# Patient Record
Sex: MALE | Race: WHITE | Employment: FULL TIME | URBAN - NONMETROPOLITAN AREA
[De-identification: names, ages, dates, MRNs, and addresses within clinical notes are randomized per-mention and may not be internally consistent; named-entity substitution may affect disease eponyms.]

---

## 2024-01-24 ENCOUNTER — PATIENT ROUNDING (BHMG ONLY) (OUTPATIENT)
Dept: PULMONOLOGY | Facility: CLINIC | Age: 59
End: 2024-01-24
Payer: COMMERCIAL

## 2024-01-24 ENCOUNTER — OFFICE VISIT (OUTPATIENT)
Dept: PULMONOLOGY | Facility: CLINIC | Age: 59
End: 2024-01-24
Payer: COMMERCIAL

## 2024-01-24 VITALS
HEART RATE: 60 BPM | HEIGHT: 71 IN | SYSTOLIC BLOOD PRESSURE: 122 MMHG | OXYGEN SATURATION: 99 % | DIASTOLIC BLOOD PRESSURE: 68 MMHG | RESPIRATION RATE: 18 BRPM | WEIGHT: 234 LBS | BODY MASS INDEX: 32.76 KG/M2

## 2024-01-24 DIAGNOSIS — J44.9 CHRONIC OBSTRUCTIVE PULMONARY DISEASE, UNSPECIFIED COPD TYPE: ICD-10-CM

## 2024-01-24 DIAGNOSIS — R06.02 SHORTNESS OF BREATH: Primary | ICD-10-CM

## 2024-01-24 DIAGNOSIS — R06.83 SNORING: ICD-10-CM

## 2024-01-24 DIAGNOSIS — E66.9 OBESITY (BMI 30-39.9): ICD-10-CM

## 2024-01-24 DIAGNOSIS — J41.0 CHRONIC BRONCHITIS, SIMPLE: ICD-10-CM

## 2024-01-24 DIAGNOSIS — F17.210 NICOTINE DEPENDENCE, CIGARETTES, UNCOMPLICATED: ICD-10-CM

## 2024-01-24 DIAGNOSIS — G47.19 EXCESSIVE DAYTIME SLEEPINESS: ICD-10-CM

## 2024-01-24 DIAGNOSIS — G47.33 OBSTRUCTIVE SLEEP APNEA: ICD-10-CM

## 2024-01-24 PROCEDURE — 95012 NITRIC OXIDE EXP GAS DETER: CPT | Performed by: INTERNAL MEDICINE

## 2024-01-24 RX ORDER — INSULIN HUMAN 100 [IU]/ML
INJECTION, SUSPENSION SUBCUTANEOUS
COMMUNITY
Start: 2023-08-16

## 2024-01-24 RX ORDER — ATORVASTATIN CALCIUM 10 MG/1
1 TABLET, FILM COATED ORAL DAILY
COMMUNITY
Start: 2023-08-16

## 2024-01-24 RX ORDER — ALBUTEROL SULFATE 90 UG/1
2 AEROSOL, METERED RESPIRATORY (INHALATION) EVERY 4 HOURS PRN
COMMUNITY

## 2024-01-24 RX ORDER — AZELASTINE 1 MG/ML
1 SPRAY, METERED NASAL 2 TIMES DAILY PRN
Qty: 1 EACH | Refills: 5 | Status: SHIPPED | OUTPATIENT
Start: 2024-01-24

## 2024-01-24 RX ORDER — LOSARTAN POTASSIUM 50 MG/1
1 TABLET ORAL DAILY
COMMUNITY

## 2024-01-24 RX ORDER — IBUPROFEN 800 MG/1
1 TABLET ORAL EVERY 8 HOURS PRN
COMMUNITY

## 2024-01-24 RX ORDER — BUDESONIDE AND FORMOTEROL FUMARATE DIHYDRATE 160; 4.5 UG/1; UG/1
2 AEROSOL RESPIRATORY (INHALATION)
Qty: 1 EACH | Refills: 5 | Status: SHIPPED | OUTPATIENT
Start: 2024-01-24

## 2024-01-24 RX ORDER — AMLODIPINE BESYLATE 10 MG/1
1 TABLET ORAL DAILY
COMMUNITY
Start: 2023-12-27

## 2024-01-24 NOTE — PROGRESS NOTES
CONSULT NOTE    Requested by:   Lorie Magana APRN      Chief Complaint   Patient presents with    Abnormal Chest X-ray    Consult       Subjective:  Darrion Lazar is a 58 y.o. male.   Patient comes in today for evaluation of shortness of breath. Patient says that for the past few years, he has been noticing that his exercise tolerance has been declining. The patient has had days when walking any significant distance is difficult to do without stopping in the middle, to catch his breath.     Patient also complains of some cough and phlegm production that occurs on most mornings out of the week, for most weeks out of the month, for the past few years. Patient used to smoke 1 pack per day for the past 40 years and is currently smoking 1 pack a day.     There seems to be a seasonal variation to the symptoms.    Patient also complains of runny nose and dribbling in the back of the throat for the past few weeks. This has been sometimes associated with seasonal variation.    he does have a family history of chronic obstructive disease, in his mother.    Upon questioning, he is complaining of sleep disturbance. Patient says that for the past few years, he has had trouble with snoring.     Patient has also been told that he sometimes quits breathing at night.       Patient says that he feels tired in the morning after waking up. he is also complaining of occasionally feeling sleepy while watching TV and sometimes while reading a book.    he is complaining of occasional headaches.    Patient's sleep schedule was reviewed. he drinks 3-4 cups/cans of caffeinated drinks per day.     Patient is under management for hypertension.      The following portions of the patient's history were reviewed and updated as appropriate: allergies, current medications, past family history, past medical history, past social history, and past surgical history.    Review of Systems   HENT:  Positive for sinus pressure. Negative for sneezing  "and sore throat.    Respiratory:  Positive for cough, chest tightness, shortness of breath and wheezing.    Cardiovascular:  Negative for palpitations and leg swelling.   Psychiatric/Behavioral:  Positive for sleep disturbance.    All other systems reviewed and are negative.      Past Medical History:   Diagnosis Date    COPD (chronic obstructive pulmonary disease)     Diabetes     High cholesterol     Hypertension     Tobacco use        Social History     Tobacco Use    Smoking status: Every Day     Packs/day: 1.00     Years: 45.00     Additional pack years: 0.00     Total pack years: 45.00     Types: Cigarettes    Smokeless tobacco: Not on file   Substance Use Topics    Alcohol use: Not on file         Objective:  Visit Vitals  /68   Pulse 60   Resp 18   Ht 180.3 cm (71\") Comment: pt reported   Wt 106 kg (234 lb)   SpO2 99%   BMI 32.64 kg/m²       Physical Exam  Vitals reviewed.   Constitutional:       Appearance: He is well-developed.   HENT:      Head: Normocephalic and atraumatic.      Mouth/Throat:      Comments: Oropharynx was crowded.  Eyes:      Extraocular Movements: Extraocular movements intact.      Pupils: Pupils are equal, round, and reactive to light.   Neck:      Thyroid: No thyromegaly.      Vascular: No JVD.      Trachea: No tracheal deviation.   Cardiovascular:      Rate and Rhythm: Normal rate and regular rhythm.   Pulmonary:      Effort: Pulmonary effort is normal. No respiratory distress.      Breath sounds: Normal breath sounds.      Comments: Somewhat hyperresonant to percussion.  Somewhat decreased air entry.  No obvious wheezing noted.   Musculoskeletal:      Cervical back: Neck supple.      Right lower leg: No edema.      Left lower leg: No edema.      Comments: Gait was normal.   Skin:     General: Skin is warm and dry.   Neurological:      Mental Status: He is alert and oriented to person, place, and time.   Psychiatric:         Mood and Affect: Mood normal.         Behavior: " "Behavior normal.         Assessment/Plan:  Diagnoses and all orders for this visit:    1. Shortness of breath (Primary)  -     Nitric Oxide  -     Complete PFT - Pre & Post Bronchodilator; Future  -     Peak Flow    2. Chronic obstructive pulmonary disease, unspecified COPD type  -     Nitric Oxide  -     Complete PFT - Pre & Post Bronchodilator; Future  -     Peak Flow    3. Chronic bronchitis, simple    4. Nicotine dependence, cigarettes, uncomplicated    5. Obstructive sleep apnea  -     Home Sleep Study; Future    6. Snoring  -     Home Sleep Study; Future    7. Excessive daytime sleepiness  -     Home Sleep Study; Future    8. Obesity (BMI 30-39.9)    Other orders  -     budesonide-formoterol (Symbicort) 160-4.5 MCG/ACT inhaler; Inhale 2 puffs 2 (Two) Times a Day. Rinse mouth with water after use.  Dispense: 1 each; Refill: 5  -     azelastine (ASTELIN) 0.1 % nasal spray; 1 spray into the nostril(s) as directed by provider 2 (Two) Times a Day As Needed for Rhinitis or Allergies. Use in each nostril as directed  Dispense: 1 each; Refill: 5        Return in about 4 months (around 5/31/2024) for Recheck, PFT F/U, Sleep study, Give Sleep quest, For Mallory Osborn),...Also 12 mths w/ Dr. Dawkins.    DISCUSSION(if any):  Last CT scan results was reviewed in great detail with the patient.  CT scan from December 2023 did not suggest any significant abnormality apart from a 4 mm lung nodule.      I have also reviewed his primary care/referring provider's last note that mentions lung nodule and possible COPD.     ===========================  ===========================    FeNO level was 5 today.    Peak flow was 400 LPM today.    PFTs were ordered for follow up visit.     Laboratory workup also showed No results found for: \"CO2\",   Lab Results   Component Value Date    HGBA1C 7.2 04/14/2023    HGBA1C 7.6 01/13/2023     ===========================  ===========================    Orders as above.    I have told the " patient, that in my view, shortness of breath is most likely from underlying chronic obstructive lung disease.     Patient was given education and demonstration on how to use the medicine.     Side effects, of prescribed medicines, discussed.    Patient was also instructed on compliance and adherence with instructions.     I have discussed the need to quit smoking as soon as possible.    Patient was offered modalities such as Chantix/nicotine patches/Wellbutrin to aid in smoking cessation.    The patient will get back to us regarding the choice, once a decision has been taken.     Patient was given reading material, as appropriate.     Patient will be started on nasal spray for symptoms which are definitely consistent with allergic rhinitis.     If his symptoms do not improve, then we will consider ordering IgE/RAST panel.    Patient will be followed clinically to assess for response to treatment and further recommendations will be made, based on response.    As far as lung nodules concern, no further workup is necessary at this time, since the nodule is only 4 mm and as per guidelines, no further workup is needed.    He will however, need to continue low-dose CT screening on an annual basis.      Sleep questionnaire was provided to the patient    The pathophysiology of sleep apnea was discussed, with the patient.     We will encourage him to schedule the sleep study soon.     The patient was made aware of the limitation of the home sleep study, whereby it may underestimate the true AHI and also carries a low sensitivity.  I have informed him that even if the home sleep study is negative, we may suggest an in lab sleep study to completely and definitively rule out/in sleep apnea.  The patient has understood.  This was communicated to the patient, in case home study is to be requested.    The patient is agreeable to try CPAP/BiPAP, if needed.     Patient was educated on good sleep hygiene measures and voiced  understanding of the same.     Patient was given reading material regarding sleep apnea    Patient was counseled regarding weight loss.       Dictated utilizing Dragon dictation.    This document was electronically signed by Uziel Dawkins MD on 01/24/24 at 11:20 EST

## 2024-01-30 ENCOUNTER — TELEPHONE (OUTPATIENT)
Dept: PULMONOLOGY | Facility: CLINIC | Age: 59
End: 2024-01-30

## 2024-01-30 NOTE — TELEPHONE ENCOUNTER
"  Caller: Darrion Lazar \"Tano Lazar\"    Relationship to patient: Self    Best call back number: [phone]    Patient is needing: PHARMACY IS HAVING TROUBLE GETTING HIS BREATHING  MEDICATIONS APPROVED. PLEASE CALL PT TO ADVISE.         "

## 2024-02-01 ENCOUNTER — OFFICE VISIT (OUTPATIENT)
Dept: CARDIOLOGY | Facility: CLINIC | Age: 59
End: 2024-02-01
Payer: COMMERCIAL

## 2024-02-01 VITALS
OXYGEN SATURATION: 96 % | WEIGHT: 234 LBS | HEIGHT: 71 IN | DIASTOLIC BLOOD PRESSURE: 84 MMHG | SYSTOLIC BLOOD PRESSURE: 145 MMHG | BODY MASS INDEX: 32.76 KG/M2

## 2024-02-01 DIAGNOSIS — I10 PRIMARY HYPERTENSION: ICD-10-CM

## 2024-02-01 DIAGNOSIS — I25.10 CORONARY ARTERY DISEASE INVOLVING NATIVE CORONARY ARTERY OF NATIVE HEART, UNSPECIFIED WHETHER ANGINA PRESENT: ICD-10-CM

## 2024-02-01 DIAGNOSIS — R06.02 SHORTNESS OF BREATH: ICD-10-CM

## 2024-02-01 DIAGNOSIS — R07.9 CHEST PAIN, UNSPECIFIED TYPE: Primary | ICD-10-CM

## 2024-02-01 PROCEDURE — 99204 OFFICE O/P NEW MOD 45 MIN: CPT | Performed by: PHYSICIAN ASSISTANT

## 2024-02-01 PROCEDURE — 93000 ELECTROCARDIOGRAM COMPLETE: CPT | Performed by: PHYSICIAN ASSISTANT

## 2024-02-01 RX ORDER — LOSARTAN POTASSIUM 100 MG/1
100 TABLET ORAL DAILY
Qty: 30 TABLET | Refills: 5 | Status: SHIPPED | OUTPATIENT
Start: 2024-02-01

## 2024-02-01 NOTE — LETTER
February 1, 2024       No Recipients    Patient: Darrion Lazar   YOB: 1965   Date of Visit: 2/1/2024       Dear ANGELINA Cleaning    Darrion Lazar was in my office today. Below is a copy of my note.    If you have questions, please do not hesitate to call me. I look forward to following Darrion along with you.         Sincerely,        TED Mchugh        CC:   No Recipients    Problem list     Subjective  Darrion Lazar is a 58 y.o. male     Chief Complaint   Patient presents with   • Establish Care     Atherosclerosis of the aorta       HPI    Patient is a 58-year-old male who presents to the office today for evaluation.  Patient describes having no previous cardiac catheterizations or interventions.  No history of coronary disease or structural heart disease.    Patient recently had a screening CT scan with longstanding history of many decades of tobacco use.  Evidence of coronary atherosclerosis was noted.  Patient has been referred for evaluation    Patient describes to me that he has had chest pain but describes an atypical discomfort.  He can experience a right-sided sharp pain or tightness in his chest.  This seems to be musculoskeletal.  It resolved spontaneously.  He does not describe it being pleuritic.    He has been short of breath that has been more noticeable.  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  He will walk up a small incline approximately 1000 to 1500 feet and then back.  Patient describes that he will be short of breath when he gets back.  He does not describe PND or orthopnea.    Patient does not complain of any significant palpitations or dysrhythmic symptoms.  Patient is stable otherwise.      Current Outpatient Medications on File Prior to Visit   Medication Sig Dispense Refill   • albuterol sulfate  (90 Base) MCG/ACT inhaler Inhale 2 puffs Every 4 (Four) Hours As Needed for Wheezing.     • amLODIPine (NORVASC) 10 MG tablet Take 1 tablet by mouth  Daily.     • atorvastatin (LIPITOR) 10 MG tablet Take 1 tablet by mouth Daily.     • azelastine (ASTELIN) 0.1 % nasal spray 1 spray into the nostril(s) as directed by provider 2 (Two) Times a Day As Needed for Rhinitis or Allergies. Use in each nostril as directed 1 each 5   • budesonide-formoterol (Symbicort) 160-4.5 MCG/ACT inhaler Inhale 2 puffs 2 (Two) Times a Day. Rinse mouth with water after use. 1 each 5   • ibuprofen (ADVIL,MOTRIN) 800 MG tablet Take 1 tablet by mouth Every 8 (Eight) Hours As Needed.     • insulin NPH-insulin regular (HumuLIN 70/30) (70-30) 100 UNIT/ML injection INJECT SUBCUTANEOUSLY 76 UNITS EVERY MORNING THEN 56 UNITS EVERY EVENING     • [DISCONTINUED] losartan (COZAAR) 50 MG tablet Take 1 tablet by mouth Daily.       No current facility-administered medications on file prior to visit.       Penicillins    Past Medical History:   Diagnosis Date   • COPD (chronic obstructive pulmonary disease)    • Diabetes    • High cholesterol    • Hypertension    • Tobacco use        Social History     Socioeconomic History   • Marital status:    Tobacco Use   • Smoking status: Every Day     Packs/day: 1.00     Years: 45.00     Additional pack years: 0.00     Total pack years: 45.00     Types: Cigarettes   Vaping Use   • Vaping Use: Never used       No family history on file.    Review of Systems   Constitutional:  Negative for activity change, appetite change, chills, fatigue and fever.   HENT: Negative.  Negative for congestion, sinus pressure and sinus pain.    Eyes: Negative.  Negative for visual disturbance.   Respiratory:  Positive for apnea and shortness of breath. Negative for cough, chest tightness and wheezing.    Cardiovascular:  Positive for chest pain. Negative for palpitations and leg swelling.   Gastrointestinal: Negative.  Negative for blood in stool.   Endocrine: Negative.  Negative for cold intolerance and heat intolerance.   Genitourinary:  Negative for hematuria.  "  Musculoskeletal: Negative.  Negative for gait problem.   Skin: Negative.  Negative for color change, rash and wound.   Allergic/Immunologic: Negative.  Negative for food allergies.   Neurological:  Positive for headaches. Negative for dizziness, syncope, weakness, light-headedness and numbness.   Hematological: Negative.  Does not bruise/bleed easily.   Psychiatric/Behavioral: Negative.  Negative for sleep disturbance.        Objective  Vitals:    02/01/24 0951   BP: 145/84   BP Location: Left arm   Patient Position: Sitting   Cuff Size: Adult   SpO2: 96%   Weight: 106 kg (234 lb)   Height: 180.3 cm (71\")      /84 (BP Location: Left arm, Patient Position: Sitting, Cuff Size: Adult)   Ht 180.3 cm (71\")   Wt 106 kg (234 lb)   SpO2 96%   BMI 32.64 kg/m²     Lab Results (most recent)       None            Physical Exam  Vitals and nursing note reviewed.   Constitutional:       General: He is not in acute distress.     Appearance: Normal appearance. He is well-developed.   HENT:      Head: Normocephalic and atraumatic.   Eyes:      General: No scleral icterus.        Right eye: No discharge.         Left eye: No discharge.      Conjunctiva/sclera: Conjunctivae normal.   Neck:      Vascular: No carotid bruit.   Cardiovascular:      Rate and Rhythm: Normal rate and regular rhythm.      Heart sounds: Normal heart sounds. No murmur heard.     No friction rub. No gallop.   Pulmonary:      Effort: Pulmonary effort is normal. No respiratory distress.      Breath sounds: Normal breath sounds. No wheezing or rales.   Chest:      Chest wall: No tenderness.   Musculoskeletal:      Right lower leg: No edema.      Left lower leg: No edema.   Skin:     General: Skin is warm and dry.      Coloration: Skin is not pale.      Findings: No erythema or rash.   Neurological:      Mental Status: He is alert and oriented to person, place, and time.      Cranial Nerves: No cranial nerve deficit.   Psychiatric:         Behavior: " Behavior normal.         Procedure    ECG 12 Lead    Date/Time: 2/1/2024 9:54 AM  Performed by: Julio César Salvador PA    Authorized by: Julio César Salvador PA  Comparison: not compared with previous ECG   Comments: EKG demonstrates sinus rhythm at 84 bpm with no acute ST changes noted             Assessment & Plan    Problems Addressed this Visit          Cardiac and Vasculature    Chest pain - Primary    Relevant Orders    Adult Transthoracic Echo Complete W/ Cont if Necessary Per Protocol    Stress Test With Myocardial Perfusion One Day    Coronary artery disease involving native coronary artery of native heart    Relevant Orders    Adult Transthoracic Echo Complete W/ Cont if Necessary Per Protocol    Stress Test With Myocardial Perfusion One Day    Primary hypertension    Relevant Medications    losartan (COZAAR) 100 MG tablet    Other Relevant Orders    Adult Transthoracic Echo Complete W/ Cont if Necessary Per Protocol    Stress Test With Myocardial Perfusion One Day       Pulmonary and Pneumonias    Shortness of breath    Relevant Orders    Adult Transthoracic Echo Complete W/ Cont if Necessary Per Protocol    Stress Test With Myocardial Perfusion One Day     Diagnoses         Codes Comments    Chest pain, unspecified type    -  Primary ICD-10-CM: R07.9  ICD-9-CM: 786.50     Shortness of breath     ICD-10-CM: R06.02  ICD-9-CM: 786.05     Coronary artery disease involving native coronary artery of native heart, unspecified whether angina present     ICD-10-CM: I25.10  ICD-9-CM: 414.01     Primary hypertension     ICD-10-CM: I10  ICD-9-CM: 401.9             Recommendation  1.  Patient is a 58-year-old male who presents to the office to be assessed.  Patient has complaints of atypical chest pain that is likely musculoskeletal.  He also has shortness of breath that is more noticeable with evidence of coronary atherosclerosis.  Patient has many decades of tobacco use and is a diabetic.  Because of that, we would  like for patient undergo assessment.    2.  Nuclear stress test will be ordered for ischemia assessment.    3.  Echo to evaluate LV systolic and diastolic performance, valvular structures, and pulmonary pressures.    4.  Otherwise, his chest pain is atypical at this time.  I want him to be on 100 mg of losartan in regards to his hypertension.  He tells me he is on this dose.  He is undergoing evaluation for sleep apnea which potentially could help his blood pressure.  He monitors this at home.  We will continue for now.  We will see him back for follow-up after testing and recommend further.  If symptoms were to worsen from now until the day of the test, he was instructed to call the office.  Follow-up with primary as scheduled.           Darrion Lazar  reports that he has been smoking cigarettes. He has a 45.00 pack-year smoking history. He does not have any smokeless tobacco history on file.. I have educated him on the risk of diseases from using tobacco products such as cancer, COPD, and heart disease.     I advised him to quit and he is not willing to quit.    I spent 3  minutes counseling the patient.          Patient brought in medicine list to appointment, it's been reviewed with patient and med list was updated in the chart.      Electronically signed by:

## 2024-02-01 NOTE — PROGRESS NOTES
Problem list     Subjective   Darrion Lazar is a 58 y.o. male     Chief Complaint   Patient presents with    [facility]     Atherosclerosis of the aorta       HPI    Patient is a 58-year-old male who presents to the office today for evaluation.  Patient describes having no previous cardiac catheterizations or interventions.  No history of coronary disease or structural heart disease.    Patient recently had a screening CT scan with longstanding history of many decades of tobacco use.  Evidence of coronary atherosclerosis was noted.  Patient has been referred for evaluation    Patient describes to me that he has had chest pain but describes an atypical discomfort.  He can experience a right-sided sharp pain or tightness in his chest.  This seems to be musculoskeletal.  It resolved spontaneously.  He does not describe it being pleuritic.    He has been short of breath that has been more noticeable.  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  He will walk up a small incline approximately 1000 to 1500 feet and then back.  Patient describes that he will be short of breath when he gets back.  He does not describe PND or orthopnea.    Patient does not complain of any significant palpitations or dysrhythmic symptoms.  Patient is stable otherwise.      Current Outpatient Medications on File Prior to Visit   Medication Sig Dispense Refill    albuterol sulfate  (90 Base) MCG/ACT inhaler Inhale 2 puffs Every 4 (Four) Hours As Needed for Wheezing.      amLODIPine (NORVASC) 10 MG tablet Take 1 tablet by mouth Daily.      atorvastatin (LIPITOR) 10 MG tablet Take 1 tablet by mouth Daily.      azelastine (ASTELIN) 0.1 % nasal spray 1 spray into the nostril(s) as directed by provider 2 (Two) Times a Day As Needed for Rhinitis or Allergies. Use in each nostril as directed 1 each 5    budesonide-formoterol (Symbicort) 160-4.5 MCG/ACT inhaler Inhale 2 puffs 2 (Two) Times a Day. Rinse mouth with water after use. 1 each 5     ibuprofen (ADVIL,MOTRIN) 800 MG tablet Take 1 tablet by mouth Every 8 (Eight) Hours As Needed.      insulin NPH-insulin regular (HumuLIN 70/30) (70-30) 100 UNIT/ML injection INJECT SUBCUTANEOUSLY 76 UNITS EVERY MORNING THEN 56 UNITS EVERY EVENING      [DISCONTINUED] losartan (COZAAR) 50 MG tablet Take 1 tablet by mouth Daily.       No current facility-administered medications on file prior to visit.       Penicillins    Past Medical History:   Diagnosis Date    COPD (chronic obstructive pulmonary disease)     Diabetes     High cholesterol     Hypertension     Tobacco use        Social History     Socioeconomic History    Marital status:    Tobacco Use    Smoking status: Every Day     Packs/day: 1.00     Years: 45.00     Additional pack years: 0.00     Total pack years: 45.00     Types: Cigarettes   Vaping Use    Vaping Use: Never used       No family history on file.    Review of Systems   Constitutional:  Negative for activity change, appetite change, chills, fatigue and fever.   HENT: Negative.  Negative for congestion, sinus pressure and sinus pain.    Eyes: Negative.  Negative for visual disturbance.   Respiratory:  Positive for apnea and shortness of breath. Negative for cough, chest tightness and wheezing.    Cardiovascular:  Positive for chest pain. Negative for palpitations and leg swelling.   Gastrointestinal: Negative.  Negative for blood in stool.   Endocrine: Negative.  Negative for cold intolerance and heat intolerance.   Genitourinary:  Negative for hematuria.   Musculoskeletal: Negative.  Negative for gait problem.   Skin: Negative.  Negative for color change, rash and wound.   Allergic/Immunologic: Negative.  Negative for food allergies.   Neurological:  Positive for headaches. Negative for dizziness, syncope, weakness, light-headedness and numbness.   Hematological: Negative.  Does not bruise/bleed easily.   Psychiatric/Behavioral: Negative.  Negative for sleep disturbance.   "      Objective   Vitals:    02/01/24 0951   BP: 145/84   BP Location: Left arm   Patient Position: Sitting   Cuff Size: Adult   SpO2: 96%   Weight: 106 kg (234 lb)   Height: 180.3 cm (71\")      /84 (BP Location: Left arm, Patient Position: Sitting, Cuff Size: Adult)   Ht 180.3 cm (71\")   Wt 106 kg (234 lb)   SpO2 96%   BMI 32.64 kg/m²     Lab Results (most recent)       None            Physical Exam  Vitals and nursing note reviewed.   Constitutional:       General: He is not in acute distress.     Appearance: Normal appearance. He is well-developed.   HENT:      Head: Normocephalic and atraumatic.   Eyes:      General: No scleral icterus.        Right eye: No discharge.         Left eye: No discharge.      Conjunctiva/sclera: Conjunctivae normal.   Neck:      Vascular: No carotid bruit.   Cardiovascular:      Rate and Rhythm: Normal rate and regular rhythm.      Heart sounds: Normal heart sounds. No murmur heard.     No friction rub. No gallop.   Pulmonary:      Effort: Pulmonary effort is normal. No respiratory distress.      Breath sounds: Normal breath sounds. No wheezing or rales.   Chest:      Chest wall: No tenderness.   Musculoskeletal:      Right lower leg: No edema.      Left lower leg: No edema.   Skin:     General: Skin is warm and dry.      Coloration: Skin is not pale.      Findings: No erythema or rash.   Neurological:      Mental Status: He is alert and oriented to person, place, and time.      Cranial Nerves: No cranial nerve deficit.   Psychiatric:         Behavior: Behavior normal.         Procedure     ECG 12 Lead    Date/Time: 2/1/2024 9:54 AM  Performed by: Julio César Salvador PA    Authorized by: Julio César Salvador PA  Comparison: not compared with previous ECG   Comments: EKG demonstrates sinus rhythm at 84 bpm with no acute ST changes noted             Assessment & Plan     Problems Addressed this Visit          Cardiac and Vasculature    Chest pain - Primary    Relevant Orders    " Adult Transthoracic Echo Complete W/ Cont if Necessary Per Protocol    Stress Test With Myocardial Perfusion One Day    Coronary artery disease involving native coronary artery of native heart    Relevant Orders    Adult Transthoracic Echo Complete W/ Cont if Necessary Per Protocol    Stress Test With Myocardial Perfusion One Day    Primary hypertension    Relevant Medications    losartan (COZAAR) 100 MG tablet    Other Relevant Orders    Adult Transthoracic Echo Complete W/ Cont if Necessary Per Protocol    Stress Test With Myocardial Perfusion One Day       Pulmonary and Pneumonias    Shortness of breath    Relevant Orders    Adult Transthoracic Echo Complete W/ Cont if Necessary Per Protocol    Stress Test With Myocardial Perfusion One Day     Diagnoses         Codes Comments    Chest pain, unspecified type    -  Primary ICD-10-CM: R07.9  ICD-9-CM: 786.50     Shortness of breath     ICD-10-CM: R06.02  ICD-9-CM: 786.05     Coronary artery disease involving native coronary artery of native heart, unspecified whether angina present     ICD-10-CM: I25.10  ICD-9-CM: 414.01     Primary hypertension     ICD-10-CM: I10  ICD-9-CM: 401.9             Recommendation  1.  Patient is a 58-year-old male who presents to the office to be assessed.  Patient has complaints of atypical chest pain that is likely musculoskeletal.  He also has shortness of breath that is more noticeable with evidence of coronary atherosclerosis.  Patient has many decades of tobacco use and is a diabetic.  Because of that, we would like for patient undergo assessment.    2.  Nuclear stress test will be ordered for ischemia assessment.    3.  Echo to evaluate LV systolic and diastolic performance, valvular structures, and pulmonary pressures.    4.  Otherwise, his chest pain is atypical at this time.  I want him to be on 100 mg of losartan in regards to his hypertension.  He tells me he is on this dose.  He is undergoing evaluation for sleep apnea which  potentially could help his blood pressure.  He monitors this at home.  We will continue for now.  We will see him back for follow-up after testing and recommend further.  If symptoms were to worsen from now until the day of the test, he was instructed to call the office.  Follow-up with primary as scheduled.           Darrion Lazar  reports that he has been smoking cigarettes. He has a 45.00 pack-year smoking history. He does not have any smokeless tobacco history on file.. I have educated him on the risk of diseases from using tobacco products such as cancer, COPD, and heart disease.     I advised him to quit and he is not willing to quit.    I spent 3  minutes counseling the patient.          Patient brought in medicine list to appointment, it's been reviewed with patient and med list was updated in the chart.      Electronically signed by:

## 2024-02-06 DIAGNOSIS — J44.9 CHRONIC OBSTRUCTIVE PULMONARY DISEASE, UNSPECIFIED COPD TYPE: Primary | ICD-10-CM

## 2024-02-06 RX ORDER — FLUTICASONE PROPIONATE AND SALMETEROL 250; 50 UG/1; UG/1
1 POWDER RESPIRATORY (INHALATION)
Qty: 60 EACH | Refills: 5 | Status: SHIPPED | OUTPATIENT
Start: 2024-02-06

## 2024-02-06 NOTE — TELEPHONE ENCOUNTER
Symbicort was denied by pt's insurance a trail and failure of fluticasone-salmeterol (generic advair) must be tried.

## 2024-02-28 ENCOUNTER — HOSPITAL ENCOUNTER (OUTPATIENT)
Dept: SLEEP MEDICINE | Facility: HOSPITAL | Age: 59
Discharge: HOME | End: 2024-02-28
Admitting: INTERNAL MEDICINE
Payer: COMMERCIAL

## 2024-02-28 DIAGNOSIS — R06.83 SNORING: ICD-10-CM

## 2024-02-28 DIAGNOSIS — G47.33 OBSTRUCTIVE SLEEP APNEA: ICD-10-CM

## 2024-02-28 DIAGNOSIS — G47.19 EXCESSIVE DAYTIME SLEEPINESS: ICD-10-CM

## 2024-02-28 PROCEDURE — 95806 SLEEP STUDY UNATT&RESP EFFT: CPT

## 2024-03-01 PROCEDURE — 95806 SLEEP STUDY UNATT&RESP EFFT: CPT | Performed by: INTERNAL MEDICINE

## 2024-03-04 ENCOUNTER — HOSPITAL ENCOUNTER (OUTPATIENT)
Dept: CARDIOLOGY | Facility: HOSPITAL | Age: 59
Discharge: HOME | End: 2024-03-04
Payer: COMMERCIAL

## 2024-03-04 DIAGNOSIS — I25.10 CORONARY ARTERY DISEASE INVOLVING NATIVE CORONARY ARTERY OF NATIVE HEART, UNSPECIFIED WHETHER ANGINA PRESENT: ICD-10-CM

## 2024-03-04 DIAGNOSIS — I10 PRIMARY HYPERTENSION: ICD-10-CM

## 2024-03-04 DIAGNOSIS — R07.9 CHEST PAIN, UNSPECIFIED TYPE: ICD-10-CM

## 2024-03-04 DIAGNOSIS — R06.02 SHORTNESS OF BREATH: ICD-10-CM

## 2024-03-04 LAB
BH CV REST NUCLEAR ISOTOPE DOSE: 10 MCI
BH CV STRESS NUCLEAR ISOTOPE DOSE: 30 MCI
BH CV STRESS RECOVERY BP: NORMAL MMHG
BH CV STRESS RECOVERY HR: 97 BPM
MAXIMAL PREDICTED HEART RATE: 160 BPM
PERCENT MAX PREDICTED HR: 88.13 %
STRESS BASELINE BP: NORMAL MMHG
STRESS BASELINE HR: 76 BPM
STRESS PERCENT HR: 104 %
STRESS POST ESTIMATED WORKLOAD: 7 METS
STRESS POST EXERCISE DUR MIN: 5 MIN
STRESS POST EXERCISE DUR SEC: 1 SEC
STRESS POST PEAK BP: NORMAL MMHG
STRESS POST PEAK HR: 141 BPM
STRESS TARGET HR: 136 BPM

## 2024-03-04 PROCEDURE — 78452 HT MUSCLE IMAGE SPECT MULT: CPT

## 2024-03-04 PROCEDURE — A9500 TC99M SESTAMIBI: HCPCS | Performed by: INTERNAL MEDICINE

## 2024-03-04 PROCEDURE — 78452 HT MUSCLE IMAGE SPECT MULT: CPT | Performed by: INTERNAL MEDICINE

## 2024-03-04 PROCEDURE — 0 TECHNETIUM SESTAMIBI: Performed by: INTERNAL MEDICINE

## 2024-03-04 PROCEDURE — 93306 TTE W/DOPPLER COMPLETE: CPT

## 2024-03-04 PROCEDURE — 93017 CV STRESS TEST TRACING ONLY: CPT

## 2024-03-04 PROCEDURE — 93306 TTE W/DOPPLER COMPLETE: CPT | Performed by: INTERNAL MEDICINE

## 2024-03-04 PROCEDURE — 93018 CV STRESS TEST I&R ONLY: CPT | Performed by: INTERNAL MEDICINE

## 2024-03-04 RX ADMIN — TECHNETIUM TC 99M SESTAMIBI 1 DOSE: 1 INJECTION INTRAVENOUS at 07:50

## 2024-03-04 RX ADMIN — TECHNETIUM TC 99M SESTAMIBI 1 DOSE: 1 INJECTION INTRAVENOUS at 09:24

## 2024-03-05 DIAGNOSIS — G47.33 OSA (OBSTRUCTIVE SLEEP APNEA): Primary | ICD-10-CM

## 2024-03-05 LAB
BH CV ECHO MEAS - ACS: 2.1 CM
BH CV ECHO MEAS - AO MAX PG: 6.5 MMHG
BH CV ECHO MEAS - AO MEAN PG: 3.6 MMHG
BH CV ECHO MEAS - AO ROOT DIAM: 3.4 CM
BH CV ECHO MEAS - AO V2 MAX: 127.6 CM/SEC
BH CV ECHO MEAS - AO V2 VTI: 26.7 CM
BH CV ECHO MEAS - EDV(CUBED): 115.5 ML
BH CV ECHO MEAS - EDV(MOD-SP4): 86.3 ML
BH CV ECHO MEAS - EF(MOD-SP4): 67.7 %
BH CV ECHO MEAS - EF_3D-VOL: 59 %
BH CV ECHO MEAS - ESV(CUBED): 22 ML
BH CV ECHO MEAS - ESV(MOD-SP4): 27.9 ML
BH CV ECHO MEAS - FS: 42.5 %
BH CV ECHO MEAS - IVS/LVPW: 1.08 CM
BH CV ECHO MEAS - IVSD: 1.63 CM
BH CV ECHO MEAS - LA DIMENSION: 3.7 CM
BH CV ECHO MEAS - LAT PEAK E' VEL: 10 CM/SEC
BH CV ECHO MEAS - LV DIASTOLIC VOL/BSA (35-75): 38.3 CM2
BH CV ECHO MEAS - LV MASS(C)D: 332.1 GRAMS
BH CV ECHO MEAS - LV SYSTOLIC VOL/BSA (12-30): 12.4 CM2
BH CV ECHO MEAS - LVIDD: 4.9 CM
BH CV ECHO MEAS - LVIDS: 2.8 CM
BH CV ECHO MEAS - LVPWD: 1.51 CM
BH CV ECHO MEAS - MED PEAK E' VEL: 8.4 CM/SEC
BH CV ECHO MEAS - MV A MAX VEL: 63.4 CM/SEC
BH CV ECHO MEAS - MV DEC SLOPE: 261.6 CM/SEC2
BH CV ECHO MEAS - MV E MAX VEL: 69 CM/SEC
BH CV ECHO MEAS - MV E/A: 1.09
BH CV ECHO MEAS - RAP SYSTOLE: 10 MMHG
BH CV ECHO MEAS - RVDD: 2.8 CM
BH CV ECHO MEAS - RVSP: 27.7 MMHG
BH CV ECHO MEAS - SI(MOD-SP4): 25.9 ML/M2
BH CV ECHO MEAS - SV(MOD-SP4): 58.4 ML
BH CV ECHO MEAS - TR MAX PG: 17.7 MMHG
BH CV ECHO MEAS - TR MAX VEL: 210.4 CM/SEC
BH CV ECHO MEASUREMENTS AVERAGE E/E' RATIO: 7.5
BH CV XLRA - RV BASE: 4 CM
BH CV XLRA - RV LENGTH: 7.7 CM
BH CV XLRA - RV MID: 2.4 CM
LEFT ATRIUM VOLUME INDEX: 11.2 ML/M2

## 2024-03-06 ENCOUNTER — TELEPHONE (OUTPATIENT)
Dept: CARDIOLOGY | Facility: CLINIC | Age: 59
End: 2024-03-06
Payer: COMMERCIAL

## 2024-03-06 NOTE — TELEPHONE ENCOUNTER
STRESS/ECHO  Pt notified of no acute findings. Provider will discuss results at f/u. Pt reminded of appt date and time.  ----- Message from TED Brown sent at 3/6/2024  1:33 PM EST -----  Routine follow-up

## 2024-05-29 ENCOUNTER — OFFICE VISIT (OUTPATIENT)
Dept: PULMONOLOGY | Facility: CLINIC | Age: 59
End: 2024-05-29
Payer: COMMERCIAL

## 2024-05-29 VITALS
SYSTOLIC BLOOD PRESSURE: 122 MMHG | HEIGHT: 71 IN | OXYGEN SATURATION: 97 % | RESPIRATION RATE: 18 BRPM | WEIGHT: 237 LBS | HEART RATE: 78 BPM | BODY MASS INDEX: 33.18 KG/M2 | DIASTOLIC BLOOD PRESSURE: 64 MMHG

## 2024-05-29 DIAGNOSIS — J44.9 CHRONIC OBSTRUCTIVE PULMONARY DISEASE, UNSPECIFIED COPD TYPE: ICD-10-CM

## 2024-05-29 DIAGNOSIS — R06.02 SHORTNESS OF BREATH: ICD-10-CM

## 2024-05-29 DIAGNOSIS — E66.9 OBESITY (BMI 30-39.9): ICD-10-CM

## 2024-05-29 DIAGNOSIS — G47.33 OSA (OBSTRUCTIVE SLEEP APNEA): Primary | ICD-10-CM

## 2024-05-29 DIAGNOSIS — F17.210 NICOTINE DEPENDENCE, CIGARETTES, UNCOMPLICATED: ICD-10-CM

## 2024-05-29 PROCEDURE — 99214 OFFICE O/P EST MOD 30 MIN: CPT | Performed by: NURSE PRACTITIONER

## 2024-05-29 RX ORDER — FLUTICASONE PROPIONATE AND SALMETEROL 250; 50 UG/1; UG/1
1 POWDER RESPIRATORY (INHALATION)
Qty: 60 EACH | Refills: 5 | Status: SHIPPED | OUTPATIENT
Start: 2024-05-29

## 2024-05-29 NOTE — PROGRESS NOTES
"Chief Complaint   Patient presents with    Shortness of Breath    Follow-up         Subjective   Darrion Lazar is a 58 y.o. male.   Patient comes in today for follow-up of shortness of breath and obstructive sleep apnea.    I reviewed his home sleep study from March and discussed the results with him today.  The home sleep study reveals moderate obstructive sleep apnea with an apnea-hypopnea index of 19/h.  His sleep apnea was worse in supine position with an AHI of 27/h.    He is not using CPAP machine. He states the cost was too much and he does not think he can use the machine.     Symptoms have been stable since the last clinic visit. Patient reports no recent exacerbations.     Patient is using medications, as prescribed. He is using Advair twice a day. He uses CHEL 1-2 times a month. He has a nebulizer also and uses when he is sick.     He does not use oxygen.     He uses Astelin as needed.     Exercise tolerance has also remained stable.     Continues to smoke 1 pack per day. He quit for 3 weeks recently but started back.       The following portions of the patient's history were reviewed and updated as appropriate: allergies, current medications, past family history, past medical history, past social history, and past surgical history.      Review of Systems   HENT:  Positive for sinus pressure and sneezing. Negative for sore throat.    Respiratory:  Positive for chest tightness and wheezing. Negative for cough and shortness of breath.    Psychiatric/Behavioral:  Negative for sleep disturbance.        Objective   Visit Vitals  /64   Pulse 78   Resp 18   Ht 180.3 cm (70.98\") Comment: pt reported   Wt 108 kg (237 lb)   SpO2 97%   BMI 33.07 kg/m²         Physical Exam  Vitals reviewed.   HENT:      Head: Atraumatic.      Mouth/Throat:      Mouth: Mucous membranes are moist.      Comments: Crowded oropharynx.   Eyes:      Extraocular Movements: Extraocular movements intact.   Cardiovascular:      Rate and " Rhythm: Normal rate and regular rhythm.   Pulmonary:      Effort: Pulmonary effort is normal. No respiratory distress.      Breath sounds: No wheezing.   Musculoskeletal:      Comments: Gait normal.    Skin:     General: Skin is warm.   Neurological:      Mental Status: He is alert and oriented to person, place, and time.           Assessment & Plan   Diagnoses and all orders for this visit:    1. ANASTASIYA (obstructive sleep apnea) (Primary)  Comments:  not on CPAP    2. Chronic obstructive pulmonary disease, unspecified COPD type  -     Fluticasone-Salmeterol (ADVAIR/WIXELA) 250-50 MCG/ACT DISKUS; Inhale 1 puff 2 (Two) Times a Day.  Dispense: 60 each; Refill: 5    3. Nicotine dependence, cigarettes, uncomplicated  -      CT Chest Low Dose Cancer Screening WO; Future    4. Obesity (BMI 30-39.9)           Return for keep appt in January, Imaging studies.    DISCUSSION (if any):  PFT today consistent with moderate obstruction. Restriction noted without air trapping suggested. Preserved diffusion capacity.     No change to the current medications has been made. COPD stable. He should continue using Advair twice a day and CHEL as needed.     He should continue Astelin as needed.     Compliance with medications stressed.     Side effects of prescribed medications discussed with the patient.    Patient was strongly encouraged to quit smoking as soon as possible.    The patient belongs to the risk group for which lung cancer screening has been recommended. He will be due annual LDCT at end of December or early January. I will order CT for BHR to be done on same day as follow up appt in January.      I have discussed the need to treat ANASTASIYA and how it can affect other organ systems including the heart. He verbalizes understanding.       Dictated utilizing Dragon dictation.    This document was electronically signed by ANGELINA Caraballo May 29, 2024  10:17 EDT

## 2025-01-21 ENCOUNTER — OFFICE VISIT (OUTPATIENT)
Dept: CARDIOLOGY | Facility: CLINIC | Age: 60
End: 2025-01-21
Payer: COMMERCIAL

## 2025-01-21 VITALS
SYSTOLIC BLOOD PRESSURE: 157 MMHG | OXYGEN SATURATION: 93 % | WEIGHT: 230 LBS | HEIGHT: 71 IN | BODY MASS INDEX: 32.2 KG/M2 | HEART RATE: 82 BPM | DIASTOLIC BLOOD PRESSURE: 82 MMHG

## 2025-01-21 DIAGNOSIS — I10 PRIMARY HYPERTENSION: ICD-10-CM

## 2025-01-21 DIAGNOSIS — R07.9 CHEST PAIN, UNSPECIFIED TYPE: ICD-10-CM

## 2025-01-21 DIAGNOSIS — I25.10 CORONARY ARTERY DISEASE INVOLVING NATIVE CORONARY ARTERY OF NATIVE HEART, UNSPECIFIED WHETHER ANGINA PRESENT: Primary | ICD-10-CM

## 2025-01-21 PROCEDURE — 99214 OFFICE O/P EST MOD 30 MIN: CPT | Performed by: PHYSICIAN ASSISTANT

## 2025-01-21 NOTE — LETTER
January 21, 2025     ANGELINA Cleaning  [address] [place] [address] [place] [address]    Patient: Darrion Lazar   YOB: 1965   Date of Visit: 1/21/2025       Dear ANGELINA Cleaning    Darrion Lazar was in my office today. Below is a copy of my note.    If you have questions, please do not hesitate to call me. I look forward to following Darrion along with you.         Sincerely,        TED Mchugh        CC: No Recipients    Problem list     Subjective  Darrion Lazar is a 59 y.o. male     Chief Complaint   Patient presents with   • Testing follow up     Ongoing chest pain, nothing new or worsening   Problem list  1.  Coronary artery disease  1.1 diagnosed by screening CT lung scan in 2024  1.2 nuclear stress test March 2024 with no evidence of ischemia preserved LV function  2.  Chronic tobacco use  3.  Insulin-dependent diabetes mellitus  4.  Hypertension  5.  Chronic lung disease    HPI    Patient is a 59-year-old male presenting back to the office for assessment.  Patient initially was referred as he had abnormal screening CT lung evaluation.  He is here today for follow-up on testing.    Clinically, he feels well.  He describes having some discomfort that he feels is more related to GI issues.  It seems that he has a pain on the right and left side of his chest at times.  He does not describe it progressing in any way and seems more of a chronic issue.    He does feel more dyspneic but continues to smoke and sees pulmonology as well.  Patient is not on oxygen therapy.  He does not describe PND or orthopnea.    He does not sense any significant palpitations or dysrhythmic symptoms.  Overall, he complains of fatigue but appears stable.      Current Outpatient Medications on File Prior to Visit   Medication Sig Dispense Refill   • albuterol sulfate  (90 Base) MCG/ACT inhaler Inhale 2 puffs Every 4 (Four) Hours As Needed for Wheezing.     • amLODIPine (NORVASC) 10 MG tablet Take 1  tablet by mouth Daily.     • atorvastatin (LIPITOR) 10 MG tablet Take 1 tablet by mouth Daily.     • azelastine (ASTELIN) 0.1 % nasal spray 1 spray into the nostril(s) as directed by provider 2 (Two) Times a Day As Needed for Rhinitis or Allergies. Use in each nostril as directed 1 each 5   • Fluticasone-Salmeterol (ADVAIR/WIXELA) 250-50 MCG/ACT DISKUS Inhale 1 puff 2 (Two) Times a Day. 60 each 5   • ibuprofen (ADVIL,MOTRIN) 800 MG tablet Take 1 tablet by mouth Every 8 (Eight) Hours As Needed.     • insulin NPH-insulin regular (HumuLIN 70/30) (70-30) 100 UNIT/ML injection INJECT SUBCUTANEOUSLY 76 UNITS EVERY MORNING THEN 56 UNITS EVERY EVENING     • losartan (COZAAR) 100 MG tablet Take 1 tablet by mouth Daily. 30 tablet 5   • [DISCONTINUED] metFORMIN (GLUCOPHAGE) 500 MG tablet Take 1 tablet by mouth 2 (Two) Times a Day With Meals.       No current facility-administered medications on file prior to visit.       Penicillins    Past Medical History:   Diagnosis Date   • COPD (chronic obstructive pulmonary disease)    • Diabetes    • High cholesterol    • Hypertension    • Tobacco use        Social History     Socioeconomic History   • Marital status:    Tobacco Use   • Smoking status: Every Day     Current packs/day: 1.00     Average packs/day: 1 pack/day for 45.0 years (45.0 ttl pk-yrs)     Types: Cigarettes   Vaping Use   • Vaping status: Never Used       History reviewed. No pertinent family history.    Review of Systems   Constitutional:  Positive for fatigue. Negative for activity change, appetite change, chills and fever.   HENT: Negative.  Negative for congestion, sinus pressure and sinus pain.    Eyes:  Negative for visual disturbance.   Respiratory:  Positive for apnea and shortness of breath. Negative for cough, chest tightness and wheezing.    Cardiovascular:  Positive for chest pain. Negative for palpitations and leg swelling.   Gastrointestinal: Negative.  Negative for blood in stool.   Endocrine:  "Negative.  Negative for cold intolerance and heat intolerance.   Genitourinary: Negative.  Negative for hematuria.   Musculoskeletal: Negative.  Negative for gait problem.   Skin: Negative.  Negative for color change, rash and wound.   Allergic/Immunologic: Negative.  Negative for environmental allergies and food allergies.   Neurological: Negative.  Negative for dizziness, syncope, weakness, light-headedness, numbness and headaches.   Hematological: Negative.  Does not bruise/bleed easily.   Psychiatric/Behavioral: Negative.  Negative for sleep disturbance.        Objective  Vitals:    01/21/25 0902   BP: 157/82   BP Location: Right arm   Patient Position: Sitting   Cuff Size: Adult   Pulse: 82   SpO2: 93%   Weight: 104 kg (230 lb)   Height: 180.3 cm (71\")      /82 (BP Location: Right arm, Patient Position: Sitting, Cuff Size: Adult)   Pulse 82   Ht 180.3 cm (71\")   Wt 104 kg (230 lb)   SpO2 93%   BMI 32.08 kg/m²     Lab Results (most recent)       None            Physical Exam  Vitals and nursing note reviewed.   Constitutional:       General: He is not in acute distress.     Appearance: Normal appearance. He is well-developed.   HENT:      Head: Normocephalic and atraumatic.   Eyes:      General: No scleral icterus.        Right eye: No discharge.         Left eye: No discharge.      Conjunctiva/sclera: Conjunctivae normal.   Neck:      Vascular: No carotid bruit.   Cardiovascular:      Rate and Rhythm: Normal rate and regular rhythm.      Heart sounds: Normal heart sounds. No murmur heard.     No friction rub. No gallop.   Pulmonary:      Effort: Pulmonary effort is normal. No respiratory distress.      Breath sounds: Normal breath sounds. No wheezing or rales.   Chest:      Chest wall: No tenderness.   Musculoskeletal:      Right lower leg: No edema.      Left lower leg: No edema.   Skin:     General: Skin is warm and dry.      Coloration: Skin is not pale.      Findings: No erythema or rash. "   Neurological:      Mental Status: He is alert and oriented to person, place, and time.      Cranial Nerves: No cranial nerve deficit.   Psychiatric:         Behavior: Behavior normal.         Procedure  Procedures       Assessment & Plan    Problems Addressed this Visit          Cardiac and Vasculature    Chest pain    Coronary artery disease involving native coronary artery of native heart - Primary    Primary hypertension     Diagnoses         Codes Comments    Coronary artery disease involving native coronary artery of native heart, unspecified whether angina present    -  Primary ICD-10-CM: I25.10  ICD-9-CM: 414.01     Chest pain, unspecified type     ICD-10-CM: R07.9  ICD-9-CM: 786.50     Primary hypertension     ICD-10-CM: I10  ICD-9-CM: 401.9             Recommendations  1.  Patient is a 59-year-old male presenting back for follow-up with known coronary disease.  This was identified by screening CT lung examination.  Stress test was negative for ischemia.  We reviewed testing with the patient.  At this point, he continues to feel discomfort but describes an atypical component to his symptoms.  It does not seem characteristic of angina.  However, he does have worsening dyspnea.  With patient's chronic lung disease and continued tobacco use, it is very likely pulmonary although with his diabetes, there could be a cardiac component.  Worsening dyspnea could represent anginal equivalency.  We discussed further evaluation even to consist of a coronary CTA to definitively evaluate coronary anatomy.  He does not seem interested.  He we will monitor symptoms for now.  We discussed with him that if symptoms were to worsen to contact our office.  I will send in nitroglycerin as needed for chest pain.  We will continue statin therapy and we recommend an LDL goal less than 70.  Patient has labs performed by primary.    2.  Patient's blood pressure slightly elevated today.  We may have to make adjustments in the future  pending blood pressure response.  He may have a degree of whitecoat hypertension.  We can monitor for now.    3.  We will see patient back for follow-up in 6 months to year or sooner if symptoms were to progress.  Follow-up with primary as scheduled.           Darrion Lazar  reports that he has been smoking cigarettes. He has a 45 pack-year smoking history. He does not have any smokeless tobacco history on file. I have educated him on the risk of diseases from using tobacco products such as cancer, COPD, and heart disease.     I advised him to quit and he is not willing to quit.    I spent 3  minutes counseling the patient.          Patient did not bring med list or medicine bottles to appointment, med list has been reviewed and updated based on patient's knowledge of their meds.      Electronically signed by:

## 2025-01-21 NOTE — PROGRESS NOTES
Problem list     Subjective   Darrion Lazar is a 59 y.o. male     Chief Complaint   Patient presents with    Testing follow up     Ongoing chest pain, nothing new or worsening   Problem list  1.  Coronary artery disease  1.1 diagnosed by screening CT lung scan in 2024  1.2 nuclear stress test March 2024 with no evidence of ischemia preserved LV function  2.  Chronic tobacco use  3.  Insulin-dependent diabetes mellitus  4.  Hypertension  5.  Chronic lung disease    HPI    Patient is a 59-year-old male presenting back to the office for assessment.  Patient initially was referred as he had abnormal screening CT lung evaluation.  He is here today for follow-up on testing.    Clinically, he feels well.  He describes having some discomfort that he feels is more related to GI issues.  It seems that he has a pain on the right and left side of his chest at times.  He does not describe it progressing in any way and seems more of a chronic issue.    He does feel more dyspneic but continues to smoke and sees pulmonology as well.  Patient is not on oxygen therapy.  He does not describe PND or orthopnea.    He does not sense any significant palpitations or dysrhythmic symptoms.  Overall, he complains of fatigue but appears stable.      Current Outpatient Medications on File Prior to Visit   Medication Sig Dispense Refill    albuterol sulfate  (90 Base) MCG/ACT inhaler Inhale 2 puffs Every 4 (Four) Hours As Needed for Wheezing.      amLODIPine (NORVASC) 10 MG tablet Take 1 tablet by mouth Daily.      atorvastatin (LIPITOR) 10 MG tablet Take 1 tablet by mouth Daily.      azelastine (ASTELIN) 0.1 % nasal spray 1 spray into the nostril(s) as directed by provider 2 (Two) Times a Day As Needed for Rhinitis or Allergies. Use in each nostril as directed 1 each 5    Fluticasone-Salmeterol (ADVAIR/WIXELA) 250-50 MCG/ACT DISKUS Inhale 1 puff 2 (Two) Times a Day. 60 each 5    ibuprofen (ADVIL,MOTRIN) 800 MG tablet Take 1 tablet by  mouth Every 8 (Eight) Hours As Needed.      insulin NPH-insulin regular (HumuLIN 70/30) (70-30) 100 UNIT/ML injection INJECT SUBCUTANEOUSLY 76 UNITS EVERY MORNING THEN 56 UNITS EVERY EVENING      losartan (COZAAR) 100 MG tablet Take 1 tablet by mouth Daily. 30 tablet 5    [DISCONTINUED] metFORMIN (GLUCOPHAGE) 500 MG tablet Take 1 tablet by mouth 2 (Two) Times a Day With Meals.       No current facility-administered medications on file prior to visit.       Penicillins    Past Medical History:   Diagnosis Date    COPD (chronic obstructive pulmonary disease)     Diabetes     High cholesterol     Hypertension     Tobacco use        Social History     Socioeconomic History    Marital status:    Tobacco Use    Smoking status: Every Day     Current packs/day: 1.00     Average packs/day: 1 pack/day for 45.0 years (45.0 ttl pk-yrs)     Types: Cigarettes   Vaping Use    Vaping status: Never Used       History reviewed. No pertinent family history.    Review of Systems   Constitutional:  Positive for fatigue. Negative for activity change, appetite change, chills and fever.   HENT: Negative.  Negative for congestion, sinus pressure and sinus pain.    Eyes:  Negative for visual disturbance.   Respiratory:  Positive for apnea and shortness of breath. Negative for cough, chest tightness and wheezing.    Cardiovascular:  Positive for chest pain. Negative for palpitations and leg swelling.   Gastrointestinal: Negative.  Negative for blood in stool.   Endocrine: Negative.  Negative for cold intolerance and heat intolerance.   Genitourinary: Negative.  Negative for hematuria.   Musculoskeletal: Negative.  Negative for gait problem.   Skin: Negative.  Negative for color change, rash and wound.   Allergic/Immunologic: Negative.  Negative for environmental allergies and food allergies.   Neurological: Negative.  Negative for dizziness, syncope, weakness, light-headedness, numbness and headaches.   Hematological: Negative.  Does  "not bruise/bleed easily.   Psychiatric/Behavioral: Negative.  Negative for sleep disturbance.        Objective   Vitals:    01/21/25 0902   BP: 157/82   BP Location: Right arm   Patient Position: Sitting   Cuff Size: Adult   Pulse: 82   SpO2: 93%   Weight: 104 kg (230 lb)   Height: 180.3 cm (71\")      /82 (BP Location: Right arm, Patient Position: Sitting, Cuff Size: Adult)   Pulse 82   Ht 180.3 cm (71\")   Wt 104 kg (230 lb)   SpO2 93%   BMI 32.08 kg/m²     Lab Results (most recent)       None            Physical Exam  Vitals and nursing note reviewed.   Constitutional:       General: He is not in acute distress.     Appearance: Normal appearance. He is well-developed.   HENT:      Head: Normocephalic and atraumatic.   Eyes:      General: No scleral icterus.        Right eye: No discharge.         Left eye: No discharge.      Conjunctiva/sclera: Conjunctivae normal.   Neck:      Vascular: No carotid bruit.   Cardiovascular:      Rate and Rhythm: Normal rate and regular rhythm.      Heart sounds: Normal heart sounds. No murmur heard.     No friction rub. No gallop.   Pulmonary:      Effort: Pulmonary effort is normal. No respiratory distress.      Breath sounds: Normal breath sounds. No wheezing or rales.   Chest:      Chest wall: No tenderness.   Musculoskeletal:      Right lower leg: No edema.      Left lower leg: No edema.   Skin:     General: Skin is warm and dry.      Coloration: Skin is not pale.      Findings: No erythema or rash.   Neurological:      Mental Status: He is alert and oriented to person, place, and time.      Cranial Nerves: No cranial nerve deficit.   Psychiatric:         Behavior: Behavior normal.         Procedure   Procedures       Assessment & Plan     Problems Addressed this Visit          Cardiac and Vasculature    Chest pain    Coronary artery disease involving native coronary artery of native heart - Primary    Primary hypertension     Diagnoses         Codes Comments    " Coronary artery disease involving native coronary artery of native heart, unspecified whether angina present    -  Primary ICD-10-CM: I25.10  ICD-9-CM: 414.01     Chest pain, unspecified type     ICD-10-CM: R07.9  ICD-9-CM: 786.50     Primary hypertension     ICD-10-CM: I10  ICD-9-CM: 401.9             Recommendations  1.  Patient is a 59-year-old male presenting back for follow-up with known coronary disease.  This was identified by screening CT lung examination.  Stress test was negative for ischemia.  We reviewed testing with the patient.  At this point, he continues to feel discomfort but describes an atypical component to his symptoms.  It does not seem characteristic of angina.  However, he does have worsening dyspnea.  With patient's chronic lung disease and continued tobacco use, it is very likely pulmonary although with his diabetes, there could be a cardiac component.  Worsening dyspnea could represent anginal equivalency.  We discussed further evaluation even to consist of a coronary CTA to definitively evaluate coronary anatomy.  He does not seem interested.  He we will monitor symptoms for now.  We discussed with him that if symptoms were to worsen to contact our office.  I will send in nitroglycerin as needed for chest pain.  We will continue statin therapy and we recommend an LDL goal less than 70.  Patient has labs performed by primary.    2.  Patient's blood pressure slightly elevated today.  We may have to make adjustments in the future pending blood pressure response.  He may have a degree of whitecoat hypertension.  We can monitor for now.    3.  We will see patient back for follow-up in 6 months to year or sooner if symptoms were to progress.  Follow-up with primary as scheduled.           Darrion Lazar  reports that he has been smoking cigarettes. He has a 45 pack-year smoking history. He does not have any smokeless tobacco history on file. I have educated him on the risk of diseases from using  tobacco products such as cancer, COPD, and heart disease.     I advised him to quit and he is not willing to quit.    I spent 3  minutes counseling the patient.          Patient did not bring med list or medicine bottles to appointment, med list has been reviewed and updated based on patient's knowledge of their meds.      Electronically signed by:

## 2025-01-22 ENCOUNTER — HOSPITAL ENCOUNTER (OUTPATIENT)
Dept: CT IMAGING | Facility: HOSPITAL | Age: 60
Discharge: HOME | End: 2025-01-22
Admitting: NURSE PRACTITIONER
Payer: COMMERCIAL

## 2025-01-22 ENCOUNTER — OFFICE VISIT (OUTPATIENT)
Dept: PULMONOLOGY | Facility: CLINIC | Age: 60
End: 2025-01-22
Payer: COMMERCIAL

## 2025-01-22 VITALS
WEIGHT: 227 LBS | OXYGEN SATURATION: 93 % | SYSTOLIC BLOOD PRESSURE: 123 MMHG | DIASTOLIC BLOOD PRESSURE: 72 MMHG | HEART RATE: 87 BPM | HEIGHT: 71 IN | BODY MASS INDEX: 31.78 KG/M2 | RESPIRATION RATE: 18 BRPM

## 2025-01-22 DIAGNOSIS — J30.89 OTHER ALLERGIC RHINITIS: ICD-10-CM

## 2025-01-22 DIAGNOSIS — G47.33 OSA (OBSTRUCTIVE SLEEP APNEA): ICD-10-CM

## 2025-01-22 DIAGNOSIS — F17.210 NICOTINE DEPENDENCE, CIGARETTES, UNCOMPLICATED: ICD-10-CM

## 2025-01-22 DIAGNOSIS — J43.9 PULMONARY EMPHYSEMA, UNSPECIFIED EMPHYSEMA TYPE: Primary | ICD-10-CM

## 2025-01-22 PROCEDURE — 99214 OFFICE O/P EST MOD 30 MIN: CPT | Performed by: INTERNAL MEDICINE

## 2025-01-22 PROCEDURE — 71271 CT THORAX LUNG CANCER SCR C-: CPT

## 2025-01-22 RX ORDER — AZELASTINE 1 MG/ML
1 SPRAY, METERED NASAL 2 TIMES DAILY PRN
Qty: 1 EACH | Refills: 5 | Status: SHIPPED | OUTPATIENT
Start: 2025-01-22

## 2025-01-22 NOTE — PROGRESS NOTES
"  Chief Complaint   Patient presents with    Shortness of Breath    Follow-up       Subjective   Darrion Lazar is a 59 y.o. male.   Patient was evaluated today for follow up of shortness of breath, allergic rhinitis and COPD.     Patient says that his symptoms have been stable since the last clinic visit. he reports no recent exacerbations.      Patient is using Wixela/Advair, as prescribed. Exercise tolerance has also remained stable.     Continues to smoke 1 pack per day.    Patient says that he has been using his nasal sprays on a seasonal basis and describes no significant ongoing issues other than occasional congestion.     The patient also was diagnosed sleep apnea but could not afford the CPAP device because of the cost associated with it.    He recently came in possession of a CPAP device from one of his family members and wanted to start using it but did not have the mask.      The following portions of the patient's history were reviewed and updated as appropriate: allergies, current medications, past family history, past medical history, past social history, and past surgical history.    Review of Systems   HENT:  Positive for sinus pressure and sneezing. Negative for sore throat.    Respiratory:  Positive for shortness of breath and wheezing. Negative for cough and chest tightness.    Psychiatric/Behavioral:  Positive for sleep disturbance.        Objective   Visit Vitals  /72   Pulse 87   Resp 18   Ht 180.3 cm (70.98\")   Wt 103 kg (227 lb)   SpO2 93%   BMI 31.67 kg/m²       BMI Readings from Last 8 Encounters:   01/22/25 31.67 kg/m²   01/21/25 32.08 kg/m²   05/29/24 33.07 kg/m²   02/01/24 32.64 kg/m²   01/24/24 32.64 kg/m²       Physical Exam  Vitals reviewed.   Constitutional:       Appearance: He is well-developed.   HENT:      Head: Normocephalic and atraumatic.   Eyes:      Extraocular Movements: Extraocular movements intact.   Cardiovascular:      Rate and Rhythm: Normal rate.   Pulmonary:    "   Comments: Somewhat hyperresonant to percussion.  Somewhat decreased air entry.  No obvious wheezing noted.   Musculoskeletal:      Cervical back: Neck supple.   Neurological:      Mental Status: He is alert.             Assessment & Plan   Diagnoses and all orders for this visit:    1. Pulmonary emphysema, unspecified emphysema type (Primary)  -     Alpha - 1 - Antitrypsin Phenotype; Future    2. ANASTASIYA (obstructive sleep apnea)    3. Other allergic rhinitis    4. Nicotine dependence, cigarettes, uncomplicated    Other orders  -     azelastine (ASTELIN) 0.1 % nasal spray; Administer 1 spray into the nostril(s) as directed by provider 2 (Two) Times a Day As Needed for Rhinitis or Allergies. Use in each nostril as directed  Dispense: 1 each; Refill: 5           Return in about 9 months (around 10/22/2025) for Recheck, For Mallory Osborn).    DISCUSSION (if any):  Today's CT scan images were reviewed in great detail with the patient.  No significant nodules noted etc.  No other acute abnormality noted.    Latest available PFTs were reviewed.  Consistent with no obstruction. FEV1 was 69%.    We have reviewed his pulmonary medications in great detail.    Compliance with medications stressed.     Side effects of prescribed medications discussed with the patient    Patient was strongly encouraged to quit smoking as soon as possible.    I discussed the results of sleep study with the patient, in detail.     Sleep study type: Home  Performed in March 2024  AHI was 19/ hour.   Supine AHI was 27/hour.     I told the patient that he has obstructive sleep apnea based on the sleep study.  The symptoms also suggest the likelihood of obstructive sleep apnea.    Based on the above, we will encourage the patient to start AutoPap at empiric pressure of 5-20 cm H2O.     Since the patient does have a device from one of his relatives, and is having trouble affording a new device through the DME company, I suggested he ask someone to set  up the device for him and start using the device.    He was given a mask from our office, to try with the new CPAP set up.    Based on symptomatic response, patient may need either a full night titration study or empiric change in his CPAP pressures.     Patient was educated on good sleep hygiene measures and voiced understanding of the same.     Patient was counseled regarding compliance with the machine and appropriate care of mask and device was discussed.    Vaccination status addressed.    Declines influenza vaccinations.     Declines pneumonia vaccinations.     For the vaccines, that he refused today, I have asked him to discuss this further with his PCP.      Dictated utilizing Dragon dictation.    This document was electronically signed by Uziel Dawkins MD on 01/22/25 at 10:49 EST